# Patient Record
Sex: MALE | Race: WHITE | NOT HISPANIC OR LATINO | Employment: OTHER | URBAN - METROPOLITAN AREA
[De-identification: names, ages, dates, MRNs, and addresses within clinical notes are randomized per-mention and may not be internally consistent; named-entity substitution may affect disease eponyms.]

---

## 2021-06-22 ENCOUNTER — TRANSCRIBE ORDERS (OUTPATIENT)
Dept: ADMINISTRATIVE | Facility: HOSPITAL | Age: 76
End: 2021-06-22

## 2021-06-22 DIAGNOSIS — I10 HYPERTENSION, UNSPECIFIED TYPE: Primary | ICD-10-CM

## 2021-06-30 ENCOUNTER — TRANSCRIBE ORDERS (OUTPATIENT)
Dept: ADMINISTRATIVE | Facility: HOSPITAL | Age: 76
End: 2021-06-30

## 2021-06-30 ENCOUNTER — HOSPITAL ENCOUNTER (OUTPATIENT)
Dept: CARDIOLOGY | Facility: HOSPITAL | Age: 76
Discharge: HOME | End: 2021-06-30

## 2021-06-30 ENCOUNTER — LAB (OUTPATIENT)
Dept: LAB | Facility: HOSPITAL | Age: 76
End: 2021-06-30

## 2021-06-30 DIAGNOSIS — I10 HYPERTENSION, UNSPECIFIED TYPE: ICD-10-CM

## 2021-06-30 DIAGNOSIS — N18.32 CHRONIC KIDNEY DISEASE (CKD) STAGE G3B/A1, MODERATELY DECREASED GLOMERULAR FILTRATION RATE (GFR) BETWEEN 30-44 ML/MIN/1.73 SQUARE METER AND ALBUMINURIA CREATININE RATIO LESS THAN 30 MG/G (CMS/H* (HCC): ICD-10-CM

## 2021-06-30 DIAGNOSIS — N18.32 CHRONIC KIDNEY DISEASE (CKD) STAGE G3B/A1, MODERATELY DECREASED GLOMERULAR FILTRATION RATE (GFR) BETWEEN 30-44 ML/MIN/1.73 SQUARE METER AND ALBUMINURIA CREATININE RATIO LESS THAN 30 MG/G (CMS/H* (HCC): Primary | ICD-10-CM

## 2021-06-30 LAB
ALBUMIN SERPL-MCNC: 4.3 G/DL (ref 3.5–5.2)
ALBUMIN/GLOB SERPL: 1.7 G/DL
ALP SERPL-CCNC: 48 U/L (ref 39–117)
ALT SERPL W P-5'-P-CCNC: 18 U/L (ref 1–41)
ANION GAP SERPL CALCULATED.3IONS-SCNC: 12.5 MMOL/L (ref 5–15)
AST SERPL-CCNC: 25 U/L (ref 1–40)
BASOPHILS # BLD AUTO: 0.05 10*3/MM3 (ref 0–0.2)
BASOPHILS NFR BLD AUTO: 0.4 % (ref 0–1.5)
BH CV ECHO MEAS - DIST REN A EDV LEFT: 7.97 CM/S
BH CV ECHO MEAS - DIST REN A PSV LEFT: 18.6 CM/S
BH CV ECHO MEAS - MID REN A EDV LEFT: 11.4 CM/S
BH CV ECHO MEAS - MID REN A PSV LEFT: 29.2 CM/S
BH CV ECHO MEAS - PROX REN A EDV LEFT: 173 CM/S
BH CV ECHO MEAS - PROX REN A PSV LEFT: 421 CM/S
BH CV VAS RENAL AORTIC MID EDV: 16.8 CM/S
BH CV VAS RENAL AORTIC MID PSV: 84.5 CM/S
BH CV VAS RENAL HILUM LEFT EDV: 5.53 CM/S
BH CV VAS RENAL HILUM LEFT PSV: 14.7 CM/S
BH CV VAS RENAL HILUM RIGHT EDV: 10.8 CM/S
BH CV VAS RENAL HILUM RIGHT PSV: 51.2 CM/S
BH CV XLRA MEAS DIST REN A EDV RIGHT: 28.5 CM/S
BH CV XLRA MEAS DIST REN A PSV RIGHT: 153 CM/S
BH CV XLRA MEAS MID REN A EDV RIGHT: 24 CM/S
BH CV XLRA MEAS MID REN A PSV RIGHT: 190 CM/S
BH CV XLRA MEAS PROX REN A EDV RIGHT: 28.8 CM/S
BH CV XLRA MEAS PROX REN A PSV RIGHT: 195 CM/S
BH CV XLRA MEAS RAR LEFT: 5
BH CV XLRA MEAS RAR RIGHT: 2.3
BILIRUB SERPL-MCNC: 0.3 MG/DL (ref 0–1.2)
BILIRUB UR QL STRIP: NEGATIVE
BUN SERPL-MCNC: 33 MG/DL (ref 8–23)
BUN/CREAT SERPL: 15.7 (ref 7–25)
CALCIUM SPEC-SCNC: 9.7 MG/DL (ref 8.6–10.5)
CHLORIDE SERPL-SCNC: 106 MMOL/L (ref 98–107)
CLARITY UR: CLEAR
CO2 SERPL-SCNC: 18.5 MMOL/L (ref 22–29)
COLOR UR: YELLOW
CREAT SERPL-MCNC: 2.1 MG/DL (ref 0.76–1.27)
DEPRECATED RDW RBC AUTO: 46.9 FL (ref 37–54)
EOSINOPHIL # BLD AUTO: 0.15 10*3/MM3 (ref 0–0.4)
EOSINOPHIL NFR BLD AUTO: 1.3 % (ref 0.3–6.2)
ERYTHROCYTE [DISTWIDTH] IN BLOOD BY AUTOMATED COUNT: 12.9 % (ref 12.3–15.4)
GFR SERPL CREATININE-BSD FRML MDRD: 31 ML/MIN/1.73
GLOBULIN UR ELPH-MCNC: 2.5 GM/DL
GLUCOSE SERPL-MCNC: 84 MG/DL (ref 65–99)
GLUCOSE UR STRIP-MCNC: NEGATIVE MG/DL
HCT VFR BLD AUTO: 43 % (ref 37.5–51)
HGB BLD-MCNC: 14.3 G/DL (ref 13–17.7)
HGB UR QL STRIP.AUTO: NEGATIVE
IMM GRANULOCYTES # BLD AUTO: 0.09 10*3/MM3 (ref 0–0.05)
IMM GRANULOCYTES NFR BLD AUTO: 0.8 % (ref 0–0.5)
KETONES UR QL STRIP: NEGATIVE
LEFT RENAL UPPER PARENCHYMA MAX: 11.4 CM/S
LEFT RENAL UPPER PARENCHYMA MIN: 5.54 CM/S
LEFT RENAL UPPER PARENCHYMA RI: 0.51
LEUKOCYTE ESTERASE UR QL STRIP.AUTO: NEGATIVE
LYMPHOCYTES # BLD AUTO: 1.96 10*3/MM3 (ref 0.7–3.1)
LYMPHOCYTES NFR BLD AUTO: 17 % (ref 19.6–45.3)
MAXIMAL PREDICTED HEART RATE: 145 BPM
MCH RBC QN AUTO: 32.5 PG (ref 26.6–33)
MCHC RBC AUTO-ENTMCNC: 33.3 G/DL (ref 31.5–35.7)
MCV RBC AUTO: 97.7 FL (ref 79–97)
MONOCYTES # BLD AUTO: 1.16 10*3/MM3 (ref 0.1–0.9)
MONOCYTES NFR BLD AUTO: 10.1 % (ref 5–12)
NEUTROPHILS NFR BLD AUTO: 70.4 % (ref 42.7–76)
NEUTROPHILS NFR BLD AUTO: 8.12 10*3/MM3 (ref 1.7–7)
NITRITE UR QL STRIP: NEGATIVE
NRBC BLD AUTO-RTO: 0 /100 WBC (ref 0–0.2)
PH UR STRIP.AUTO: 6.5 [PH] (ref 5–8)
PHOSPHATE SERPL-MCNC: 3 MG/DL (ref 2.5–4.5)
PLATELET # BLD AUTO: 326 10*3/MM3 (ref 140–450)
PMV BLD AUTO: 10.3 FL (ref 6–12)
POTASSIUM SERPL-SCNC: 5.4 MMOL/L (ref 3.5–5.2)
PROT SERPL-MCNC: 6.8 G/DL (ref 6–8.5)
PROT UR QL STRIP: ABNORMAL
PTH-INTACT SERPL-MCNC: 29.5 PG/ML (ref 15–65)
RBC # BLD AUTO: 4.4 10*6/MM3 (ref 4.14–5.8)
RIGHT RENAL UPPER PARENCHYMA MAX: 28.4 CM/S
RIGHT RENAL UPPER PARENCHYMA MIN: 6.02 CM/S
RIGHT RENAL UPPER PARENCHYMA RI: 0.79
SODIUM SERPL-SCNC: 137 MMOL/L (ref 136–145)
SP GR UR STRIP: 1.02 (ref 1–1.03)
STRESS TARGET HR: 123 BPM
UROBILINOGEN UR QL STRIP: ABNORMAL
WBC # BLD AUTO: 11.53 10*3/MM3 (ref 3.4–10.8)

## 2021-06-30 PROCEDURE — 82570 ASSAY OF URINE CREATININE: CPT

## 2021-06-30 PROCEDURE — 82784 ASSAY IGA/IGD/IGG/IGM EACH: CPT

## 2021-06-30 PROCEDURE — 80053 COMPREHEN METABOLIC PANEL: CPT

## 2021-06-30 PROCEDURE — 85025 COMPLETE CBC W/AUTO DIFF WBC: CPT

## 2021-06-30 PROCEDURE — 84100 ASSAY OF PHOSPHORUS: CPT

## 2021-06-30 PROCEDURE — 86334 IMMUNOFIX E-PHORESIS SERUM: CPT

## 2021-06-30 PROCEDURE — 81003 URINALYSIS AUTO W/O SCOPE: CPT

## 2021-06-30 PROCEDURE — 93975 VASCULAR STUDY: CPT | Performed by: SURGERY

## 2021-06-30 PROCEDURE — 84156 ASSAY OF PROTEIN URINE: CPT

## 2021-06-30 PROCEDURE — 83970 ASSAY OF PARATHORMONE: CPT

## 2021-06-30 PROCEDURE — 36415 COLL VENOUS BLD VENIPUNCTURE: CPT

## 2021-06-30 PROCEDURE — 93975 VASCULAR STUDY: CPT

## 2021-07-01 LAB
IGA SERPL-MCNC: 176 MG/DL (ref 61–437)
IGG SERPL-MCNC: 751 MG/DL (ref 603–1613)
IGM SERPL-MCNC: 67 MG/DL (ref 15–143)
PROT PATTERN SERPL IFE-IMP: ABNORMAL

## 2021-07-02 LAB
CREAT UR-MCNC: 96.6 MG/DL
PROT UR-MCNC: 13 MG/DL
PROT/CREAT UR: 0.1 MG/G{CREAT}

## 2022-08-29 ENCOUNTER — TELEPHONE (OUTPATIENT)
Dept: VASCULAR SURGERY | Facility: HOSPITAL | Age: 77
End: 2022-08-29

## 2022-08-29 NOTE — TELEPHONE ENCOUNTER
L/M WITH GINNA AT [facility] REGARDING REFERRAL, GAVE APPT TIME OF 9/20/22 AT 8:30.

## 2022-08-31 ENCOUNTER — OFFICE VISIT (OUTPATIENT)
Dept: VASCULAR SURGERY | Facility: HOSPITAL | Age: 77
End: 2022-08-31

## 2022-08-31 VITALS
OXYGEN SATURATION: 98 % | RESPIRATION RATE: 16 BRPM | HEIGHT: 66 IN | DIASTOLIC BLOOD PRESSURE: 58 MMHG | TEMPERATURE: 97.6 F | SYSTOLIC BLOOD PRESSURE: 108 MMHG | WEIGHT: 135 LBS | BODY MASS INDEX: 21.69 KG/M2 | HEART RATE: 51 BPM

## 2022-08-31 DIAGNOSIS — I73.9 PAD (PERIPHERAL ARTERY DISEASE): Primary | ICD-10-CM

## 2022-08-31 DIAGNOSIS — I70.1 RENAL ARTERY STENOSIS: ICD-10-CM

## 2022-08-31 PROCEDURE — G0463 HOSPITAL OUTPT CLINIC VISIT: HCPCS | Performed by: SURGERY

## 2022-08-31 PROCEDURE — 99204 OFFICE O/P NEW MOD 45 MIN: CPT | Performed by: SURGERY

## 2022-08-31 RX ORDER — PRAVASTATIN SODIUM 10 MG
10 TABLET ORAL
COMMUNITY
Start: 2022-08-12 | End: 2022-08-31

## 2022-08-31 RX ORDER — ALLOPURINOL 300 MG/1
300 TABLET ORAL
COMMUNITY
Start: 2022-06-25

## 2022-08-31 RX ORDER — CILOSTAZOL 50 MG/1
50 TABLET ORAL 2 TIMES DAILY
Qty: 60 TABLET | Refills: 6 | Status: SHIPPED | OUTPATIENT
Start: 2022-08-31

## 2022-08-31 RX ORDER — FLUTICASONE PROPIONATE 50 MCG
2 SPRAY, SUSPENSION (ML) NASAL DAILY
COMMUNITY
Start: 2022-08-10

## 2022-08-31 RX ORDER — COLCHICINE 0.6 MG/1
TABLET ORAL
COMMUNITY
Start: 2022-06-27

## 2022-08-31 RX ORDER — FENOFIBRATE 160 MG/1
160 TABLET ORAL
COMMUNITY
Start: 2022-05-12

## 2022-08-31 RX ORDER — GABAPENTIN 300 MG/1
300 CAPSULE ORAL
COMMUNITY
Start: 2022-07-29

## 2022-08-31 RX ORDER — OXYCODONE AND ACETAMINOPHEN 10; 325 MG/1; MG/1
1 TABLET ORAL
COMMUNITY
Start: 2022-06-24

## 2022-08-31 RX ORDER — HYDROCHLOROTHIAZIDE 12.5 MG/1
12.5 TABLET ORAL
COMMUNITY
Start: 2022-05-26

## 2022-08-31 RX ORDER — ATENOLOL 50 MG/1
50 TABLET ORAL DAILY
COMMUNITY
Start: 2022-08-10

## 2022-08-31 RX ORDER — MONTELUKAST SODIUM 10 MG/1
10 TABLET ORAL
COMMUNITY
Start: 2022-06-10

## 2022-08-31 RX ORDER — TRAZODONE HYDROCHLORIDE 100 MG/1
100 TABLET ORAL
COMMUNITY
Start: 2022-05-16

## 2022-08-31 NOTE — PROGRESS NOTES
[facility]   HISTORY AND PHYSICAL    Patient Name: Noah Rapp  : 1945  MRN: [id number]  Primary Care Physician:  Clara Conrad DO  Date of admission: (Not on file)    Subjective   Subjective     Chief Complaint: Right leg pain with ambulation    HPI:    Noah Rapp is a 77 y.o. male with about a 1 year history of right leg pain with ambulation, mostly the hip itself, which stops him after about 40 to 50 feet of walking.  He does not walk much because of these.  He does not usually go out.  He has also been told that there is some narrowing of his carotid arteries but did not seem to be as significant.  He is also being followed for renal insufficiency by Dr. Correa and has been told that the left kidney has atrophied.  He comes to see me for further evaluation from the vascular standpoint.    Review of Systems    Non contributory except for the History of Present Illness    Personal History     History reviewed. No pertinent past medical history.    History reviewed. No pertinent surgical history.    Family History: family history is not on file. Otherwise pertinent FHx was reviewed and not pertinent to current issue.    Social History:  reports that he has been smoking cigarettes. He has been smoking about 0.25 packs per day. He has never used smokeless tobacco. He reports that he does not drink alcohol and does not use drugs.    Home Medications:  Current Outpatient Medications on File Prior to Visit   Medication Sig   • allopurinol (ZYLOPRIM) 300 MG tablet Take 300 mg by mouth.   • ASPIRIN 81 PO Take 81 mg by mouth Daily.   • atenolol (TENORMIN) 50 MG tablet Take 50 mg by mouth Daily.   • colchicine 0.6 MG tablet TAKE TWO TABLETS AT first, THEN one tablet an hour UNTIL pain improved OR diarrhea develops (max SIX tabs in 24 hours). THEN one a DAY FOR THREE DAYS   • fenofibrate 160 MG tablet Take 160 mg by mouth.   • fluticasone (FLONASE) 50 MCG/ACT nasal spray 2 sprays by Each Nare  route Daily.   • Fluticasone-Umeclidin-Vilant (Trelegy Ellipta) 100-62.5-25 MCG/INH inhaler Inhale 1 puff Daily.   • gabapentin (NEURONTIN) 300 MG capsule Take 300 mg by mouth.   • hydroCHLOROthiazide (HYDRODIURIL) 12.5 MG tablet Take 12.5 mg by mouth.   • montelukast (SINGULAIR) 10 MG tablet Take 10 mg by mouth.   • oxyCODONE-acetaminophen (PERCOCET)  MG per tablet Take 1 tablet by mouth.   • traZODone (DESYREL) 100 MG tablet Take 100 mg by mouth.   • VITAMIN D, ERGOCALCIFEROL, PO Take  by mouth Daily.   • [DISCONTINUED] pravastatin (PRAVACHOL) 10 MG tablet Take 10 mg by mouth every night at bedtime.     No current facility-administered medications on file prior to visit.          Allergies:  No Known Allergies    Objective   Objective     Vitals:   Temp:  [97.6 °F (36.4 °C)] 97.6 °F (36.4 °C)  Heart Rate:  [51] 51  Resp:  [16] 16  BP: (108)/(58) 108/58    Physical Exam   General: Alert, no acute distress.  Neck: Supple, faint right carotid bruit.  Heart: Regular rate  Lungs: Clear  Abdomen: Benign  Extremities: Symmetric  Pulses: Nonpalpable bilateral pedal pulses.    Diagnostic studies:   A CTA from July from [facility] demonstrates right external iliac artery occlusion.  There is also significant bilateral SFA disease.  There is a long segment stenosis of the left renal artery with an atrophied left kidney down to 7 cm.  There appears to be evidence of right renal artery stenosis at the origin, focal.  The right kidney is normal in size.  His most recent creatinine is 2.12 on 8/17/2022.  I am unable to identify a GFR.    Assessment & Plan   Assessment / Plan     Active Hospital Problems:  There are no active hospital problems to display for this patient.      Diagnoses and all orders for this visit:    1. PAD (peripheral artery disease) (HCC) (Primary)    2. Renal artery stenosis (HCC)    Other orders  -     cilostazol (PLETAL) 50 MG tablet; Take 1 tablet by mouth 2 (Two) Times a Day.   Dispense: 60 tablet; Refill: 6        Assessment/plan:   Mr. Rapp has significant peripheral vascular disease.  He is experiencing lifestyle limiting intermittent claudication.  His claudication is associated with large vessel occlusion and it is not very likely to respond to medical management however ultimately decision has been made to try medical management first.  He will also probably need angiography anyway with possible renal artery intervention.  We will discuss that on his follow-up depending on his response to medical management of his claudication.  He reports carotid stenosis but a report for the duplex is not available and we are requesting that.  At this time he will follow-up with me in 6 weeks.      Electronically signed by Jaguar Nguyen MD, 08/31/22, 9:13 AM EDT.      A report for a carotid duplex from [facility] dated 12/3/2018 demonstrated moderate diffuse plaque across both bifurcations with no hemodynamically significant stenosis on either side.  There is a most recent report provided by the hospital.    Electronically signed by Jaguar Nguyen MD, 08/31/22, 4:26 PM EDT.

## 2022-09-09 ENCOUNTER — PATIENT ROUNDING (BHMG ONLY) (OUTPATIENT)
Dept: VASCULAR SURGERY | Facility: HOSPITAL | Age: 77
End: 2022-09-09

## 2022-09-09 NOTE — PROGRESS NOTES
"Called patient as part of weekly round up. Patient states he was very pleased with Dr. Nguyen and the entire staff at the practice. Patient stated that \" everyone was very friendly and kind\". Patient stated he was started on Pletal and has had some dizziness but \" that it's not bad\". Instructed patient to call us if symptoms become worse. Patient verbalized understanding.   "

## 2022-11-04 ENCOUNTER — OFFICE VISIT (OUTPATIENT)
Dept: VASCULAR SURGERY | Facility: HOSPITAL | Age: 77
End: 2022-11-04

## 2022-11-04 VITALS
HEART RATE: 53 BPM | DIASTOLIC BLOOD PRESSURE: 60 MMHG | TEMPERATURE: 97.1 F | RESPIRATION RATE: 18 BRPM | OXYGEN SATURATION: 99 % | SYSTOLIC BLOOD PRESSURE: 122 MMHG

## 2022-11-04 DIAGNOSIS — I70.219 ATHEROSCLEROSIS OF LOWER EXTREMITY WITH CLAUDICATION: Primary | ICD-10-CM

## 2022-11-04 DIAGNOSIS — I70.1 RENAL ARTERY STENOSIS: ICD-10-CM

## 2022-11-04 PROCEDURE — 99214 OFFICE O/P EST MOD 30 MIN: CPT | Performed by: SURGERY

## 2022-11-04 PROCEDURE — G0463 HOSPITAL OUTPT CLINIC VISIT: HCPCS | Performed by: SURGERY

## 2022-11-04 NOTE — PROGRESS NOTES
[facility]   Follow up Office    Patient Name: Noah Rapp  : 1945  MRN: [id number]  Primary Care Physician:  Clara Conrad DO      Subjective   Subjective     HPI:    Noah Rapp is a 77 y.o. male here for follow-up for peripheral vascular disease and lifestyle limiting intermittent claudication affecting his right lower extremity.  He feels that his right leg is not any better after trying cilostazol, in fact it may seem to be a little worse.  After a prolonged period of standing he gets to the point where his leg fails and he ends up falling.  After resting for about 15 or 20 minutes he can continue going.      Objective     Vitals:   Temp:  [97.1 °F (36.2 °C)] 97.1 °F (36.2 °C)  Heart Rate:  [53] 53  Resp:  [18] 18  BP: (122)/(60) 122/60    Physical Exam      General: Alert, no acute distress.  Extremities: Symmetric.    Assessment & Plan   Assessment / Plan     Diagnoses and all orders for this visit:    1. Atherosclerosis of lower extremity with claudication (HCC) (Primary)  -     Case Request; Standing  -     ceFAZolin (ANCEF) 2 g in sodium chloride 0.9 % 100 mL IVPB  -     Case Request    2. Renal artery stenosis (HCC)  -     Case Request; Standing  -     ceFAZolin (ANCEF) 2 g in sodium chloride 0.9 % 100 mL IVPB  -     Case Request    Other orders  -     Obtain Informed Consent; Future  -     Provide NPO Instructions to Patient; Future  -     CBC & Differential; Standing  -     Basic Metabolic Panel; Standing       Assessment/Plan:   Mr. Rapp has lifestyle limiting intermittent claudication affecting his right lower extremity.  He also has significant right renal artery stenosis based on a previous CTA with atrophy of the left kidney.  He has renal insufficiency.  At this time I am recommending that we proceed to angiography with possible endovascular intervention.  The plan will be to attempt to recanalize the occluded right iliac system via either right or left femoral  access or possibly even both.  I discussed with him as well indications to evaluate his right renal artery and possibly intervene depending on the progress of the procedure.  The plan will be to use CO2 and as little contrast as possible.  I have discussed with the patient in detail the mechanics of the procedure, the indications, benefits, risks, alternatives as well as potential complications to include but not limited to infection, bleeding, inability to cross the occlusion, vascular injury requiring surgical repair.  He appears to understand and desires to proceed.        Electronically signed by Jaguar Nguyen MD, 11/04/22, 11:41 AM EDT.

## 2022-11-09 PROCEDURE — S0260 H&P FOR SURGERY: HCPCS | Performed by: SURGERY

## 2022-11-09 NOTE — H&P
[facility]   HISTORY AND PHYSICAL    Patient Name: Noah Rapp  : 1945  MRN: [id number]  Primary Care Physician:  Clara Conrad DO  Date of admission: (Not on file)    Subjective   Subjective     Chief Complaint: Right leg pain with ambulation    HPI:    Noah Rapp is a 77 y.o. male with about a 1 year history of worsening right leg pain with ambulation.  He is able to walk approximately 50 feet.    Review of Systems    Non contributory except for the History of Present Illness    Personal History     Past Medical History:   Diagnosis Date   • Anxiety    • Depression    • Gout    • Hypertension    • Peripheral vascular disease (HCC)        Past Surgical History:   Procedure Laterality Date   • CERVICAL SPINE SURGERY         Family History: family history is not on file. Otherwise pertinent FHx was reviewed and not pertinent to current issue.    Social History:  reports that he has been smoking cigarettes. He has been smoking an average of 1 pack per day. He has never used smokeless tobacco. He reports that he does not drink alcohol and does not use drugs.    Home Medications:  No current facility-administered medications on file prior to encounter.     Current Outpatient Medications on File Prior to Encounter   Medication Sig   • allopurinol (ZYLOPRIM) 300 MG tablet Take 300 mg by mouth.   • ASPIRIN 81 PO Take 81 mg by mouth Daily.   • atenolol (TENORMIN) 50 MG tablet Take 50 mg by mouth Daily.   • cilostazol (PLETAL) 50 MG tablet Take 1 tablet by mouth 2 (Two) Times a Day.   • colchicine 0.6 MG tablet TAKE TWO TABLETS AT first, THEN one tablet an hour UNTIL pain improved OR diarrhea develops (max SIX tabs in 24 hours). THEN one a DAY FOR THREE DAYS   • fenofibrate 160 MG tablet Take 160 mg by mouth.   • fluticasone (FLONASE) 50 MCG/ACT nasal spray 2 sprays by Each Nare route Daily.   • Fluticasone-Umeclidin-Vilant (Trelegy Ellipta) 100-62.5-25 MCG/INH inhaler Inhale 1 puff Daily.   •  gabapentin (NEURONTIN) 300 MG capsule Take 300 mg by mouth.   • hydroCHLOROthiazide (HYDRODIURIL) 12.5 MG tablet Take 12.5 mg by mouth.   • montelukast (SINGULAIR) 10 MG tablet Take 10 mg by mouth.   • oxyCODONE-acetaminophen (PERCOCET)  MG per tablet Take 1 tablet by mouth.   • traZODone (DESYREL) 100 MG tablet Take 100 mg by mouth.   • VITAMIN D, ERGOCALCIFEROL, PO Take  by mouth Daily.          Allergies:  No Known Allergies    Objective   Objective     Vitals:        Physical Exam   General: Alert, no acute distress.  Neck: Supple  Heart: Regular rate  Lungs: Clear  Abdomen: Benign  Extremities: Symmetric  Pulses: Nonpalpable right pedal pulses.    Diagnostic studies:   A CTA from July from [facility] demonstrates right external iliac artery occlusion.  There is also significant bilateral SFA disease.  There is a long segment stenosis of the left renal artery with an atrophied left kidney down to 7 cm.  There appears to be evidence of right renal artery stenosis at the origin, focal.  The right kidney is normal in size.  His most recent creatinine is 2.12 on 8/17/2022.  I am unable to identify a GFR.    Assessment & Plan   Assessment / Plan     Active Hospital Problems:  Active Hospital Problems    Diagnosis    • Atherosclerosis of lower extremity with claudication (HCC)    • Renal artery stenosis (HCC)        Diagnoses and all orders for this visit:    1. Atherosclerosis of lower extremity with claudication (HCC)  -     Cardiac Catheterization/Vascular Study; Standing  -     Cardiac Catheterization/Vascular Study    2. Renal artery stenosis (HCC)  -     Cardiac Catheterization/Vascular Study; Standing  -     Cardiac Catheterization/Vascular Study        Assessment/plan:   Mr. Rapp has lifestyle limiting intermittent claudication affecting his right lower extremity.  He also has significant right renal artery stenosis based on a previous CTA with atrophy of the left kidney.  He has renal  insufficiency.  At this time I am recommending that we proceed to angiography with possible endovascular intervention.  The plan will be to attempt to recanalize the occluded right iliac system via either right or left femoral access or possibly even both.  I discussed with him as well indications to evaluate his right renal artery and possibly intervene depending on the progress of the procedure.  The plan will be to use CO2 and as little contrast as possible.  I have discussed with the patient in detail the mechanics of the procedure, the indications, benefits, risks, alternatives as well as potential complications to include but not limited to infection, bleeding, inability to cross the occlusion, vascular injury requiring surgical repair.  He appears to understand and desires to proceed.      Electronically signed by Jaguar Nguyen MD, 11/09/22, 12:51 PM EST.

## 2022-11-10 ENCOUNTER — HOSPITAL ENCOUNTER (OUTPATIENT)
Facility: HOSPITAL | Age: 77
Setting detail: HOSPITAL OUTPATIENT SURGERY
Discharge: HOME | End: 2022-11-10
Attending: SURGERY | Admitting: SURGERY

## 2022-11-10 VITALS
TEMPERATURE: 98.4 F | SYSTOLIC BLOOD PRESSURE: 154 MMHG | DIASTOLIC BLOOD PRESSURE: 75 MMHG | RESPIRATION RATE: 16 BRPM | WEIGHT: 135 LBS | BODY MASS INDEX: 21.69 KG/M2 | OXYGEN SATURATION: 99 % | HEIGHT: 66 IN | HEART RATE: 60 BPM

## 2022-11-10 DIAGNOSIS — I70.219 ATHEROSCLEROSIS OF LOWER EXTREMITY WITH CLAUDICATION: ICD-10-CM

## 2022-11-10 DIAGNOSIS — I70.1 RENAL ARTERY STENOSIS: ICD-10-CM

## 2022-11-10 LAB
ANION GAP SERPL CALCULATED.3IONS-SCNC: 9.1 MMOL/L (ref 5–15)
BASOPHILS # BLD AUTO: 0.04 10*3/MM3 (ref 0–0.2)
BASOPHILS NFR BLD AUTO: 0.3 % (ref 0–1.5)
BUN SERPL-MCNC: 34 MG/DL (ref 8–23)
BUN/CREAT SERPL: 15.4 (ref 7–25)
CALCIUM SPEC-SCNC: 9.4 MG/DL (ref 8.6–10.5)
CHLORIDE SERPL-SCNC: 101 MMOL/L (ref 98–107)
CO2 SERPL-SCNC: 22.9 MMOL/L (ref 22–29)
CREAT SERPL-MCNC: 2.21 MG/DL (ref 0.76–1.27)
DEPRECATED RDW RBC AUTO: 47.5 FL (ref 37–54)
EGFRCR SERPLBLD CKD-EPI 2021: 30.1 ML/MIN/1.73
EOSINOPHIL # BLD AUTO: 0.19 10*3/MM3 (ref 0–0.4)
EOSINOPHIL NFR BLD AUTO: 1.4 % (ref 0.3–6.2)
ERYTHROCYTE [DISTWIDTH] IN BLOOD BY AUTOMATED COUNT: 13.2 % (ref 12.3–15.4)
GLUCOSE SERPL-MCNC: 104 MG/DL (ref 65–99)
HCT VFR BLD AUTO: 38.4 % (ref 37.5–51)
HGB BLD-MCNC: 12.9 G/DL (ref 13–17.7)
IMM GRANULOCYTES # BLD AUTO: 0.12 10*3/MM3 (ref 0–0.05)
IMM GRANULOCYTES NFR BLD AUTO: 0.9 % (ref 0–0.5)
LYMPHOCYTES # BLD AUTO: 1.72 10*3/MM3 (ref 0.7–3.1)
LYMPHOCYTES NFR BLD AUTO: 13.1 % (ref 19.6–45.3)
MCH RBC QN AUTO: 32.5 PG (ref 26.6–33)
MCHC RBC AUTO-ENTMCNC: 33.6 G/DL (ref 31.5–35.7)
MCV RBC AUTO: 96.7 FL (ref 79–97)
MONOCYTES # BLD AUTO: 1.3 10*3/MM3 (ref 0.1–0.9)
MONOCYTES NFR BLD AUTO: 9.9 % (ref 5–12)
NEUTROPHILS NFR BLD AUTO: 74.4 % (ref 42.7–76)
NEUTROPHILS NFR BLD AUTO: 9.74 10*3/MM3 (ref 1.7–7)
NRBC BLD AUTO-RTO: 0 /100 WBC (ref 0–0.2)
PLATELET # BLD AUTO: 309 10*3/MM3 (ref 140–450)
PMV BLD AUTO: 9 FL (ref 6–12)
POTASSIUM SERPL-SCNC: 4.3 MMOL/L (ref 3.5–5.2)
RBC # BLD AUTO: 3.97 10*6/MM3 (ref 4.14–5.8)
SODIUM SERPL-SCNC: 133 MMOL/L (ref 136–145)
WBC NRBC COR # BLD: 13.11 10*3/MM3 (ref 3.4–10.8)

## 2022-11-10 PROCEDURE — C1725 CATH, TRANSLUMIN NON-LASER: HCPCS | Performed by: SURGERY

## 2022-11-10 PROCEDURE — C1894 INTRO/SHEATH, NON-LASER: HCPCS | Performed by: SURGERY

## 2022-11-10 PROCEDURE — 25010000002 CEFAZOLIN IN DEXTROSE 2-4 GM/100ML-% SOLUTION: Performed by: SURGERY

## 2022-11-10 PROCEDURE — C1887 CATHETER, GUIDING: HCPCS | Performed by: SURGERY

## 2022-11-10 PROCEDURE — 25010000002 HEPARIN (PORCINE) PER 1000 UNITS: Performed by: SURGERY

## 2022-11-10 PROCEDURE — 99152 MOD SED SAME PHYS/QHP 5/>YRS: CPT | Performed by: SURGERY

## 2022-11-10 PROCEDURE — 37221 PR REVSC OPN/PRQ ILIAC ART W/STNT PLMT & ANGIOPLSTY: CPT | Performed by: SURGERY

## 2022-11-10 PROCEDURE — C1876 STENT, NON-COA/NON-COV W/DEL: HCPCS | Performed by: SURGERY

## 2022-11-10 PROCEDURE — 99153 MOD SED SAME PHYS/QHP EA: CPT | Performed by: SURGERY

## 2022-11-10 PROCEDURE — C1769 GUIDE WIRE: HCPCS | Performed by: SURGERY

## 2022-11-10 PROCEDURE — C1760 CLOSURE DEV, VASC: HCPCS | Performed by: SURGERY

## 2022-11-10 PROCEDURE — 25010000002 MIDAZOLAM PER 1 MG: Performed by: SURGERY

## 2022-11-10 PROCEDURE — 85025 COMPLETE CBC W/AUTO DIFF WBC: CPT | Performed by: SURGERY

## 2022-11-10 PROCEDURE — 80048 BASIC METABOLIC PNL TOTAL CA: CPT | Performed by: SURGERY

## 2022-11-10 PROCEDURE — 75716 ARTERY X-RAYS ARMS/LEGS: CPT | Performed by: SURGERY

## 2022-11-10 PROCEDURE — 25010000002 FENTANYL CITRATE (PF) 100 MCG/2ML SOLUTION: Performed by: SURGERY

## 2022-11-10 PROCEDURE — 36251 INS CATH REN ART 1ST UNILAT: CPT | Performed by: SURGERY

## 2022-11-10 PROCEDURE — 75630 X-RAY AORTA LEG ARTERIES: CPT | Performed by: SURGERY

## 2022-11-10 DEVICE — STNT PROTEGE EVERFLX SEXP .035 7X80 80: Type: IMPLANTABLE DEVICE | Status: FUNCTIONAL

## 2022-11-10 RX ORDER — SODIUM CHLORIDE 9 MG/ML
100 INJECTION, SOLUTION INTRAVENOUS CONTINUOUS
Status: CANCELLED | OUTPATIENT
Start: 2022-11-10 | End: 2022-11-10

## 2022-11-10 RX ORDER — CEFAZOLIN SODIUM 2 G/100ML
2 INJECTION, SOLUTION INTRAVENOUS ONCE
Status: COMPLETED | OUTPATIENT
Start: 2022-11-10 | End: 2022-11-10

## 2022-11-10 RX ORDER — ENOXAPARIN SODIUM 100 MG/ML
40 INJECTION SUBCUTANEOUS DAILY
Status: CANCELLED | OUTPATIENT
Start: 2022-11-11

## 2022-11-10 RX ORDER — MIDAZOLAM HYDROCHLORIDE 1 MG/ML
INJECTION INTRAMUSCULAR; INTRAVENOUS
Status: DISCONTINUED | OUTPATIENT
Start: 2022-11-10 | End: 2022-11-10 | Stop reason: HOSPADM

## 2022-11-10 RX ORDER — FENTANYL CITRATE 50 UG/ML
INJECTION, SOLUTION INTRAMUSCULAR; INTRAVENOUS
Status: DISCONTINUED | OUTPATIENT
Start: 2022-11-10 | End: 2022-11-10 | Stop reason: HOSPADM

## 2022-11-10 RX ORDER — LIDOCAINE HYDROCHLORIDE 20 MG/ML
INJECTION, SOLUTION INFILTRATION; PERINEURAL
Status: DISCONTINUED | OUTPATIENT
Start: 2022-11-10 | End: 2022-11-10 | Stop reason: HOSPADM

## 2022-11-10 RX ORDER — HEPARIN SODIUM 1000 [USP'U]/ML
INJECTION, SOLUTION INTRAVENOUS; SUBCUTANEOUS
Status: DISCONTINUED | OUTPATIENT
Start: 2022-11-10 | End: 2022-11-10 | Stop reason: HOSPADM

## 2022-11-10 RX ADMIN — CEFAZOLIN SODIUM 2 G: 2 INJECTION, SOLUTION INTRAVENOUS at 11:22

## 2022-11-10 NOTE — PROGRESS NOTES
Angiogram performed.  CO2 contrast only.  Successful recanalization of the right external iliac artery with angioplasty and stenting.  No significant stenosis of the right renal artery identified.  For details find full report under chart review, cardiology tab.

## 2024-10-02 ENCOUNTER — TELEPHONE (OUTPATIENT)
Dept: VASCULAR SURGERY | Facility: HOSPITAL | Age: 79
End: 2024-10-02
Payer: MEDICARE

## 2024-10-02 NOTE — TELEPHONE ENCOUNTER
CALLED NUMBER LEFT IN ENCOUNTER AND WENT TO A MAILBOX THAT HAD NOT BEEN SETUP ALSO CALLED PT NUMBER LISTED IN CHART, IT WAS DISCONNECTED. CALLED ALTERNATE CONTACT AND WAS ABLE TO LEAVE MESSAGE FOR PT TO INFORM HIM WE DO NOT DO TELEHEALTH APT AND TO CALL BACK TO MAKE F/U

## 2024-10-02 NOTE — TELEPHONE ENCOUNTER
Caller: Noah Rapp    Relationship to patient: Self    Best call back number: [phone]    Chief complaint: PT WANTS TELEHEALTH APPT     Type of visit: FOLLOW UP    Requested date: NA    If rescheduling, when is the original appointment: NA     Additional notes:NA

## 2024-10-15 ENCOUNTER — OFFICE VISIT (OUTPATIENT)
Dept: VASCULAR SURGERY | Facility: HOSPITAL | Age: 79
End: 2024-10-15
Payer: MEDICARE

## 2024-10-15 VITALS
OXYGEN SATURATION: 91 % | DIASTOLIC BLOOD PRESSURE: 70 MMHG | SYSTOLIC BLOOD PRESSURE: 150 MMHG | TEMPERATURE: 97.8 F | HEART RATE: 50 BPM | RESPIRATION RATE: 18 BRPM

## 2024-10-15 DIAGNOSIS — I65.23 CAROTID STENOSIS, BILATERAL: ICD-10-CM

## 2024-10-15 DIAGNOSIS — I70.219 ATHEROSCLEROSIS OF LOWER EXTREMITY WITH CLAUDICATION: Primary | ICD-10-CM

## 2024-10-15 PROCEDURE — G0463 HOSPITAL OUTPT CLINIC VISIT: HCPCS | Performed by: NURSE PRACTITIONER

## 2024-10-15 RX ORDER — PREGABALIN 75 MG/1
75 CAPSULE ORAL 2 TIMES DAILY
COMMUNITY

## 2024-10-15 RX ORDER — CITALOPRAM HYDROBROMIDE 20 MG/1
20 TABLET ORAL DAILY
COMMUNITY

## 2024-10-15 RX ORDER — AMLODIPINE AND OLMESARTAN MEDOXOMIL 5; 20 MG/1; MG/1
1 TABLET ORAL DAILY
COMMUNITY

## 2024-10-17 NOTE — PROGRESS NOTES
[facility] Vascular Surgery Office Follow Up Note     Date of Encounter: 10/15/2024     MRN Number: [id number]  Name: oNah Rapp  Phone Number: [phone]     Referred By: Zaheer Solorio APRN  PCP: Clara Conrad DO    Chief Complaint:    Chief Complaint   Patient presents with    Carotid Artery Disease    Follow-up     Patient is here as a follow up to discuss results from a carotid ultrasound completed at another facility. Patient is established with vascular. Patient had a procedure in 2022 by Dr. Nguyen.        Subjective      History of Present Illness:    Noah Rapp is a 79 y.o. male presents for follow-up.  He was previously seen in 2022 and had a CO2 angiogram performed with recanalization of the right external iliac artery and angioplasty and stenting by Dr. Nguyen.  He has not followed up in the office since the procedure.  He was referred back to us for a carotid duplex recently performed at [facility].  He denies any acute symptoms to suggest CVA, TIA amaurosis fugax, no claudication or ischemic rest pain.    Review of Systems:  ROS  Review of Systems   Constitutional: Negative.   HENT: Negative.    Cardiovascular: Negative.    Respiratory: Negative.    Skin: Negative.    Musculoskeletal: Negative.    Gastrointestinal: Negative.    Neurological: Negative.    Psychiatric/Behavioral: Negative.      I have reviewed the following portions of the patient's history: problem list, current medications, allergies, past surgical history, past medical history, past social history, past family history, and ROS and confirm it's accurate.    Allergies:  No Known Allergies    Medications:      Current Outpatient Medications:     allopurinol (ZYLOPRIM) 300 MG tablet, Take 1 tablet by mouth., Disp: , Rfl:     amlodipine-olmesartan (RON) 5-20 MG per tablet, Take 1 tablet by mouth Daily., Disp: , Rfl:     ASPIRIN 81 PO, Take 81 mg by mouth Daily. (Patient taking  differently: Take 325 mg by mouth Daily.), Disp: , Rfl:     atenolol (TENORMIN) 50 MG tablet, Take 1 tablet by mouth Daily., Disp: , Rfl:     citalopram (CeleXA) 20 MG tablet, Take 1 tablet by mouth Daily., Disp: , Rfl:     fenofibrate 160 MG tablet, Take 1 tablet by mouth., Disp: , Rfl:     fluticasone (FLONASE) 50 MCG/ACT nasal spray, 2 sprays by Each Nare route Daily., Disp: , Rfl:     Fluticasone-Umeclidin-Vilant (Trelegy Ellipta) 100-62.5-25 MCG/INH inhaler, Inhale 1 puff Daily., Disp: , Rfl:     hydroCHLOROthiazide (HYDRODIURIL) 12.5 MG tablet, Take 1 tablet by mouth., Disp: , Rfl:     montelukast (SINGULAIR) 10 MG tablet, Take 1 tablet by mouth., Disp: , Rfl:     oxyCODONE-acetaminophen (PERCOCET)  MG per tablet, Take 1 tablet by mouth., Disp: , Rfl:     pregabalin (LYRICA) 75 MG capsule, Take 1 capsule by mouth 2 (Two) Times a Day., Disp: , Rfl:     traZODone (DESYREL) 100 MG tablet, Take 1 tablet by mouth., Disp: , Rfl:     VITAMIN D, ERGOCALCIFEROL, PO, Take  by mouth Daily., Disp: , Rfl:     cilostazol (PLETAL) 50 MG tablet, Take 1 tablet by mouth 2 (Two) Times a Day. (Patient not taking: Reported on 10/15/2024), Disp: 60 tablet, Rfl: 6    colchicine 0.6 MG tablet, TAKE TWO TABLETS AT first, THEN one tablet an hour UNTIL pain improved OR diarrhea develops (max SIX tabs in 24 hours). THEN one a DAY FOR THREE DAYS (Patient not taking: Reported on 10/15/2024), Disp: , Rfl:     gabapentin (NEURONTIN) 300 MG capsule, Take 300 mg by mouth. (Patient not taking: Reported on 10/15/2024), Disp: , Rfl:     History:   Past Medical History:   Diagnosis Date    Anxiety     Depression     Gout     Hypertension     Peripheral vascular disease        Past Surgical History:   Procedure Laterality Date    CARDIAC CATHETERIZATION N/A 11/10/2022    Procedure: Carbon dioxide aortogram, with bilateral leg angiogram, possible right renal angiogram, possible angioplasty or stenting;  Surgeon: Jaguar Nguyen MD;  Location:  [facility] INVASIVE LOCATION;  Service: Vascular;  Laterality: N/A;    CERVICAL SPINE SURGERY         Social History     Socioeconomic History    Marital status:    Tobacco Use    Smoking status: Every Day     Current packs/day: 1.00     Types: Cigarettes    Smokeless tobacco: Never   Vaping Use    Vaping status: Never Used   Substance and Sexual Activity    Alcohol use: Never    Drug use: Never    Sexual activity: Defer        History reviewed. No pertinent family history.    Objective     Physical Exam:  Vitals:    10/15/24 1455   BP: 150/70   BP Location: Right arm   Patient Position: Sitting   Cuff Size: Large Adult   Pulse: 50   Resp: 18   Temp: 97.8 °F (36.6 °C)   TempSrc: Temporal   SpO2: 91%   PainSc: 0-No pain      There is no height or weight on file to calculate BMI.    Physical Exam  Physical Exam  Constitutional:       Appearance:Normal.   HENT:      Head: Normocephalic and atraumatic.   Eyes:      Extraocular Movements: Extraocular movements intact.      Pupils: Pupils are equal, round, and reactive to light.   Neck:      Comments:   Cardiovascular:      Rate and Rhythm: Normal rate and regular rhythm.   Pulmonary:      Effort: Pulmonary effort is normal.    Musculoskeletal:      Cervical back: Normal range of motion and neck supple.   Skin:     General: Skin is warm and dry.   Neurological:      General: No focal deficit present.      Mental Status: Alert and oriented to person, place, and time.     Imaging/Labs:  I have reviewed the results from the carotid duplex that was performed at [facility] on    Assessment / Plan      Assessment / Plan:  Diagnoses and all orders for this visit:    1. Atherosclerosis of lower extremity with claudication (Primary)    2. Carotid stenosis, bilateral     Mr Rapp has lifestyle limiting intermittent claudication and mild to moderate carotid artery disease.  The duplex performed at [facility] revealed moderate stenosis of  bilateral carotid arteries of 50 to 69%.  I recommend a follow-up in 1 year with a carotid duplex and an arterial Doppler.    I have answered all of his questions and he is in agreement with the plan at this time. Thank you for allowing me to participate in your patient's care.  Patient Education: Smoking cessation counseled      Follow Up:   Return for Annual Claudication, Annual Carotid.   Or sooner for any further concerns or worsening sign and symptoms. If unable to reach us in the office please dial 911 or go to the nearest emergency department.      Zaheer SINGH  [facility] Vascular Surgery

## 2025-08-13 ENCOUNTER — TRANSCRIBE ORDERS (OUTPATIENT)
Age: 80
End: 2025-08-13
Payer: MEDICAID

## 2025-08-13 DIAGNOSIS — I65.23 BILATERAL CAROTID ARTERY STENOSIS: Primary | ICD-10-CM

## 2025-08-13 DIAGNOSIS — I70.219 EXTREMITY ATHEROSCLEROSIS WITH INTERMITTENT CLAUDICATION: Primary | ICD-10-CM

## (undated) DEVICE — INTRO SHEATH PRELUDE PRO MARKRTIP 6F11

## (undated) DEVICE — BALN EVERCROSS OTW .035 6F 7X80MM 80CM

## (undated) DEVICE — KT INTRO MIC VSI SMOTH STFF 4F 40CM 7CM

## (undated) DEVICE — CATH OMNI FLUSH 5FR

## (undated) DEVICE — CATH SOS OMNI 5FRX80CM SZ0

## (undated) DEVICE — RADIFOCUS GLIDEWIRE: Brand: GLIDEWIRE

## (undated) DEVICE — INTRO SHEATH PRELUDE PRO .035 5F11X50 LF

## (undated) DEVICE — GW STARTER JB STR .035 15X180CM

## (undated) DEVICE — BALN EVERCROSS OTW .035 5F 6X60MM 80CM

## (undated) DEVICE — DEV CLS VASC MYNXCONTROL 6FTO7F